# Patient Record
Sex: FEMALE | Race: WHITE | NOT HISPANIC OR LATINO | URBAN - NONMETROPOLITAN AREA
[De-identification: names, ages, dates, MRNs, and addresses within clinical notes are randomized per-mention and may not be internally consistent; named-entity substitution may affect disease eponyms.]

---

## 2019-02-27 ENCOUNTER — IMPORTED ENCOUNTER (OUTPATIENT)
Dept: URBAN - NONMETROPOLITAN AREA CLINIC 1 | Facility: CLINIC | Age: 34
End: 2019-02-27

## 2019-02-27 PROCEDURE — S0621 ROUTINE OPHTHALMOLOGICAL EXA: HCPCS

## 2019-02-27 PROCEDURE — 92310 CONTACT LENS FITTING OU: CPT

## 2019-02-27 NOTE — PATIENT DISCUSSION
*[facility]:. -  A new spectacle prescription was created and dispensed today. *Contact lens habits:. -  Healthy contact lens wearing habits were discussed including nightly removal and cleaning of contacts and adherence to the recommended replacement schedule for disposable lenses. -  Symptoms of infection were reviewed and instructions were given to discontinue contact lens use and call for an urgent appointment in the event of an eye infection. *Cl fit:.-  Contact lenses fit well appropriately moving and re-centering with each blink.

## 2022-04-10 ASSESSMENT — VISUAL ACUITY
OS_CC: J1
OU_SC: 20/20
OD_CC: J1
OS_SC: 20/20
OD_SC: 20/20
OU_SC: 20/15
OD_SC: 20/15